# Patient Record
Sex: MALE | Race: WHITE | NOT HISPANIC OR LATINO | URBAN - METROPOLITAN AREA
[De-identification: names, ages, dates, MRNs, and addresses within clinical notes are randomized per-mention and may not be internally consistent; named-entity substitution may affect disease eponyms.]

---

## 2020-02-02 ENCOUNTER — OFFICE VISIT (OUTPATIENT)
Dept: URGENT CARE | Facility: CLINIC | Age: 65
End: 2020-02-02
Payer: COMMERCIAL

## 2020-02-02 VITALS
RESPIRATION RATE: 16 BRPM | DIASTOLIC BLOOD PRESSURE: 84 MMHG | HEIGHT: 67 IN | TEMPERATURE: 96.9 F | BODY MASS INDEX: 25.15 KG/M2 | WEIGHT: 160.2 LBS | OXYGEN SATURATION: 97 % | HEART RATE: 83 BPM | SYSTOLIC BLOOD PRESSURE: 138 MMHG

## 2020-02-02 DIAGNOSIS — H57.11 EYE PAIN, RIGHT: Primary | ICD-10-CM

## 2020-02-02 DIAGNOSIS — H01.001 BLEPHARITIS OF RIGHT UPPER EYELID, UNSPECIFIED TYPE: ICD-10-CM

## 2020-02-02 PROCEDURE — G0382 LEV 3 HOSP TYPE B ED VISIT: HCPCS | Performed by: PHYSICIAN ASSISTANT

## 2020-02-02 RX ORDER — AMOXICILLIN 500 MG/1
CAPSULE ORAL
COMMUNITY
Start: 2020-01-14

## 2020-02-02 RX ORDER — ACYCLOVIR 400 MG/1
400 TABLET ORAL 2 TIMES DAILY
COMMUNITY
Start: 2019-11-13

## 2020-02-02 RX ORDER — ERYTHROMYCIN 5 MG/G
OINTMENT OPHTHALMIC
Qty: 3.5 G | Refills: 0 | Status: SHIPPED | OUTPATIENT
Start: 2020-02-02

## 2020-02-02 RX ORDER — TETRACAINE HYDROCHLORIDE 5 MG/ML
2 SOLUTION OPHTHALMIC ONCE
Status: COMPLETED | OUTPATIENT
Start: 2020-02-02 | End: 2020-02-02

## 2020-02-02 RX ORDER — OLANZAPINE 2.5 MG/1
2.5 TABLET ORAL EVERY EVENING
COMMUNITY
Start: 2019-12-05

## 2020-02-02 RX ORDER — PURIFIED WATER 986 MG/ML
SOLUTION OPHTHALMIC ONCE AS NEEDED
Status: COMPLETED | OUTPATIENT
Start: 2020-02-02 | End: 2020-02-02

## 2020-02-02 RX ADMIN — PURIFIED WATER: 986 SOLUTION OPHTHALMIC at 10:52

## 2020-02-02 RX ADMIN — TETRACAINE HYDROCHLORIDE 2 DROP: 5 SOLUTION OPHTHALMIC at 10:53

## 2020-02-02 NOTE — PROGRESS NOTES
NAME: Lesley Ruano is a 62 y o  male  : 1955    MRN: [id number]      Assessment and Plan   Eye pain, right [H57 11]  1  Eye pain, right  ophthalmic wash (EYE STREAM) ophthalmic solution    tetracaine 0 5 % ophthalmic solution 2 drop    fluorescein sodium sterile 1 mg ophthalmic strip 1 strip    erythromycin (ILOTYCIN) ophthalmic ointment   2  Blepharitis of right upper eyelid, unspecified type     Exam findings are consistent with blepharitis  Erythromycin ointment sent to pharmacy Take medication as noted  Encourage good hand hygiene  Advise Pt to continue use of OTC Tylenol alternating with OTC Ibuprofen  If symptoms persist the next 2-3 days Pt should follow-up with PCP  Patient understands and agrees with treatment plans  Administrations This Visit     fluorescein sodium sterile 1 mg ophthalmic strip 1 strip     Admin Date  2020 Action  Given Dose  1 strip Route  Right Eye Administered By  Nupur Sahu RN          ophthalmic wash (EYE STREAM) ophthalmic solution     Admin Date  2020 Action  Given Dose   Route  Right Eye Administered By  Nupur Sahu RN          tetracaine 0 5 % ophthalmic solution 2 drop     Admin Date  2020 Action  Given Dose  2 drop Route  Right Eye Administered By  Nupur Sahu RN              John Calvin was seen today for eye pain  Diagnoses and all orders for this visit:    Eye pain, right  -     ophthalmic wash (EYE STREAM) ophthalmic solution  -     tetracaine 0 5 % ophthalmic solution 2 drop  -     fluorescein sodium sterile 1 mg ophthalmic strip 1 strip  -     erythromycin (ILOTYCIN) ophthalmic ointment; Place a 1/2 inch ribbon of ointment into the right upper  eyelids  Use 4 times a day for 7 days    Blepharitis of right upper eyelid, unspecified type        Patient Instructions   There are no Patient Instructions on file for this visit  Proceed to ER if symptoms worsen      Chief Complaint     Chief Complaint Patient presents with    Eye Pain     Right eye, Friday pt was removing ceiling tile and dust fell in his eye, he has been rubbing it and not sure if he scratched himself or its just irritation         History of Present Illness     63yo Pt presents with partner c/o right eye redness  x 3 day  Pt reports Friday he was removing as ceiling tile and felt dusts going to his right eye  Patient reports flushing out his right eye  Patient states he still feels irritation in his right eye  Pt admits  gritty sensation, burning sensation, swollen right eyelids  Pt denies excessive tearing, itchy eyelids,  crusting on eyelids, flaking or scaling of the eyelid skin, light sensitivity, blurred vision  Denies trouble with vision  Denies eye pain or pain with eye movement  Denies redness or swelling around the eyes  Review of Systems   Review of Systems   Constitutional: Negative for chills, fatigue and fever  Eyes: Positive for pain (Right) and itching ( right)  Negative for photophobia, discharge and redness  Respiratory: Negative  Cardiovascular: Negative  Neurological: Negative for headaches  Current Medications       Current Outpatient Medications:     acyclovir (ZOVIRAX) 400 MG tablet, Take 400 mg by mouth 2 (two) times a day, Disp: , Rfl:     amoxicillin (AMOXIL) 500 mg capsule, , Disp: , Rfl:     metFORMIN (GLUCOPHAGE) 1000 MG tablet, TAKE 1 TABLET (1,000 MG TOTAL) BY MOUTH 2 (TWO) TIMES A DAY WITH A MEAL  , Disp: , Rfl:     OLANZapine (ZyPREXA) 2 5 mg tablet, Take 2 5 mg by mouth every evening, Disp: , Rfl:     erythromycin (ILOTYCIN) ophthalmic ointment, Place a 1/2 inch ribbon of ointment into the right upper  eyelids  Use 4 times a day for 7 days, Disp: 3 5 g, Rfl: 0  No current facility-administered medications for this visit       Current Allergies     Allergies as of 02/02/2020    (No Known Allergies)              Past Medical History:   Diagnosis Date    Diabetes mellitus [facility])        Past Surgical History:   Procedure Laterality Date    DENTAL SURGERY         No family history on file  Medications have been verified  The following portions of the patient's history were reviewed and updated as appropriate: allergies, current medications, past family history, past medical history, past social history, past surgical history and problem list     Objective   /84   Pulse 83   Temp (!) 96 9 °F (36 1 °C) (Tympanic)   Resp 16   Ht 5' 7" (1 702 m)   Wt 72 7 kg (160 lb 3 2 oz)   SpO2 97%   BMI 25 09 kg/m²      Physical Exam     Physical Exam   Constitutional: He appears well-developed  No distress  Eyes: Pupils are equal, round, and reactive to light  Conjunctivae and EOM are normal  Lids are everted and swept, no foreign bodies found  Right eye exhibits no discharge  Left eye exhibits no discharge  Cardiovascular: Normal rate, regular rhythm, normal heart sounds and intact distal pulses  Exam reveals no gallop and no friction rub  No murmur heard  Pulmonary/Chest: Effort normal and breath sounds normal  No stridor  No respiratory distress  He has no wheezes  He has no rales  He exhibits no tenderness  Nursing note and vitals reviewed        Micike Russ PA-C